# Patient Record
Sex: FEMALE | Race: WHITE | NOT HISPANIC OR LATINO | URBAN - NONMETROPOLITAN AREA
[De-identification: names, ages, dates, MRNs, and addresses within clinical notes are randomized per-mention and may not be internally consistent; named-entity substitution may affect disease eponyms.]

---

## 2015-10-02 PROBLEM — H52.4: Noted: 2020-01-13

## 2015-10-02 PROBLEM — H26.493: Noted: 2019-01-08

## 2015-10-02 PROBLEM — H35.341: Noted: 2019-01-08

## 2015-10-02 PROBLEM — E11.9: Noted: 2019-01-08

## 2015-10-02 PROBLEM — E11.3293: Noted: 2020-01-13

## 2015-10-02 PROBLEM — H52.223: Noted: 2020-01-13

## 2015-10-02 PROBLEM — H52.03: Noted: 2020-01-13

## 2019-01-08 ENCOUNTER — IMPORTED ENCOUNTER (OUTPATIENT)
Dept: URBAN - NONMETROPOLITAN AREA CLINIC 1 | Facility: CLINIC | Age: 74
End: 2019-01-08

## 2019-01-08 PROCEDURE — 92015 DETERMINE REFRACTIVE STATE: CPT

## 2019-01-08 PROCEDURE — 92014 COMPRE OPH EXAM EST PT 1/>: CPT

## 2019-01-08 NOTE — PATIENT DISCUSSION
Type 2 DM - 2003Last A1C was 8.2 % Oct 2018. s/p macular hole sx  11/25/2014 by Dr Darya Pavon. Stable. NIDDM x 15 years - minimal BDR. IOLs OU - doing well. BF rx given - notices much improvement.

## 2020-01-13 ENCOUNTER — IMPORTED ENCOUNTER (OUTPATIENT)
Dept: URBAN - NONMETROPOLITAN AREA CLINIC 1 | Facility: CLINIC | Age: 75
End: 2020-01-13

## 2020-01-13 PROCEDURE — 92015 DETERMINE REFRACTIVE STATE: CPT

## 2020-01-13 PROCEDURE — 92014 COMPRE OPH EXAM EST PT 1/>: CPT

## 2020-01-13 NOTE — PATIENT DISCUSSION
Pseudophakia OU w/PCO OU-  discussed findings w/patient-  PCO noted OD>OS no treatment at this time -  monitor yearly or prn Type II DM w/Mild Retinopathy OU (Dx 2003)-  discussed findings w/patient-  condition controlled with medication -  mild retinopathy noted OU stable from previous no macular edema noted OU -  discussed the importance of good blood sugar control and the negative effects of poorly controlled sugars on ocular health -  monitor yearly or prn s/p Macular Hole-  discussed findings w/patient-  sx  11/25/2014 by Dr Quyen Pham. 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

## 2021-01-18 ENCOUNTER — IMPORTED ENCOUNTER (OUTPATIENT)
Dept: URBAN - NONMETROPOLITAN AREA CLINIC 1 | Facility: CLINIC | Age: 76
End: 2021-01-18

## 2021-01-18 ENCOUNTER — PREPPED CHART (OUTPATIENT)
Dept: RURAL CLINIC 1 | Facility: CLINIC | Age: 76
End: 2021-01-18

## 2021-01-18 PROCEDURE — 92014 COMPRE OPH EXAM EST PT 1/>: CPT

## 2021-01-18 NOTE — PATIENT DISCUSSION
Pseudophakia OU w/PCO OU-  discussed findings w/patient-  PCO noted OD>OS no treatment at this time -  monitor yearly or prn Type II DM w/Mild Retinopathy OU (Dx 2003)-  discussed findings w/patient-  condition controlled with medication -  mild retinopathy noted OU stable from previous no macular edema noted OU -  discussed the importance of good blood sugar control and the negative effects of poorly controlled sugars on ocular health -  monitor yearly or prn s/p Macular Hole-  discussed findings w/patient-  sx  11/25/2014 by Dr COSMO GARCIA. -  stable and doing well-  monitor yearly or prn; 's Notes: MR 1/13/2020DE 1/13/2020

## 2022-01-18 ASSESSMENT — TONOMETRY
OD_IOP_MMHG: 17
OS_IOP_MMHG: 17

## 2022-01-18 ASSESSMENT — VISUAL ACUITY
OD_CC: 20/40
OS_CC: 20/25
OS_CC: J1
OD_CC: J1

## 2022-01-24 ENCOUNTER — COMPREHENSIVE EXAM (OUTPATIENT)
Dept: RURAL CLINIC 1 | Facility: CLINIC | Age: 77
End: 2022-01-24

## 2022-01-24 DIAGNOSIS — E11.9: ICD-10-CM

## 2022-01-24 PROCEDURE — 92015 DETERMINE REFRACTIVE STATE: CPT

## 2022-01-24 PROCEDURE — 92014 COMPRE OPH EXAM EST PT 1/>: CPT

## 2022-01-24 ASSESSMENT — VISUAL ACUITY
OS_CC: 20/25
OD_CC: 20/40

## 2022-04-09 ASSESSMENT — VISUAL ACUITY
OU_CC: J1
OD_SC: 20/40-2
OS_SC: 20/25
OD_CC: 20/40-2
OD_SC: 20/40
OS_CC: 20/30
OU_CC: J1
OS_SC: 20/25

## 2022-04-09 ASSESSMENT — TONOMETRY
OS_IOP_MMHG: 16
OD_IOP_MMHG: 16
OS_IOP_MMHG: 17
OD_IOP_MMHG: 16
OS_IOP_MMHG: 17
OD_IOP_MMHG: 17

## 2024-02-16 ENCOUNTER — COMPREHENSIVE EXAM (OUTPATIENT)
Dept: RURAL CLINIC 1 | Facility: CLINIC | Age: 79
End: 2024-02-16

## 2024-02-16 DIAGNOSIS — E11.9: ICD-10-CM

## 2024-02-16 DIAGNOSIS — Z96.1: ICD-10-CM

## 2024-02-16 DIAGNOSIS — H26.493: ICD-10-CM

## 2024-02-16 PROCEDURE — 92014 COMPRE OPH EXAM EST PT 1/>: CPT

## 2024-02-16 ASSESSMENT — VISUAL ACUITY
OU_CC: 20/25
OD_CC: 20/40
OS_CC: 20/25-2

## 2024-02-16 ASSESSMENT — TONOMETRY
OS_IOP_MMHG: 16
OD_IOP_MMHG: 16

## 2024-07-26 ENCOUNTER — EMERGENCY VISIT (OUTPATIENT)
Dept: RURAL CLINIC 1 | Facility: CLINIC | Age: 79
End: 2024-07-26

## 2024-07-26 DIAGNOSIS — H26.493: ICD-10-CM

## 2024-07-26 DIAGNOSIS — H00.031: ICD-10-CM

## 2024-07-26 DIAGNOSIS — Z96.1: ICD-10-CM

## 2024-07-26 PROCEDURE — 99213 OFFICE O/P EST LOW 20 MIN: CPT

## 2024-07-26 ASSESSMENT — VISUAL ACUITY
OD_SC: 20/60-1
OS_SC: 20/30+2

## 2024-07-26 ASSESSMENT — TONOMETRY
OD_IOP_MMHG: 12
OS_IOP_MMHG: 12

## 2025-02-19 ENCOUNTER — COMPREHENSIVE EXAM (OUTPATIENT)
Age: 80
End: 2025-02-19

## 2025-02-19 DIAGNOSIS — E11.9: ICD-10-CM

## 2025-02-19 DIAGNOSIS — H26.493: ICD-10-CM

## 2025-02-19 DIAGNOSIS — Z96.1: ICD-10-CM

## 2025-02-19 PROCEDURE — 92014 COMPRE OPH EXAM EST PT 1/>: CPT
